# Patient Record
Sex: MALE | Race: WHITE | NOT HISPANIC OR LATINO | Employment: FULL TIME | URBAN - METROPOLITAN AREA
[De-identification: names, ages, dates, MRNs, and addresses within clinical notes are randomized per-mention and may not be internally consistent; named-entity substitution may affect disease eponyms.]

---

## 2018-09-13 ENCOUNTER — OFFICE VISIT (OUTPATIENT)
Dept: PRIMARY CARE CLINIC | Facility: CLINIC | Age: 58
End: 2018-09-13
Payer: COMMERCIAL

## 2018-09-13 VITALS
SYSTOLIC BLOOD PRESSURE: 118 MMHG | RESPIRATION RATE: 18 BRPM | DIASTOLIC BLOOD PRESSURE: 74 MMHG | HEIGHT: 71 IN | BODY MASS INDEX: 37.66 KG/M2 | HEART RATE: 108 BPM | WEIGHT: 269 LBS | OXYGEN SATURATION: 99 %

## 2018-09-13 DIAGNOSIS — S39.012A LUMBOSACRAL STRAIN, INITIAL ENCOUNTER: Primary | ICD-10-CM

## 2018-09-13 DIAGNOSIS — E66.9 OBESITY, UNSPECIFIED CLASSIFICATION, UNSPECIFIED OBESITY TYPE, UNSPECIFIED WHETHER SERIOUS COMORBIDITY PRESENT: ICD-10-CM

## 2018-09-13 DIAGNOSIS — Z72.0 TOBACCO ABUSE: ICD-10-CM

## 2018-09-13 DIAGNOSIS — Z98.890 HISTORY OF RIGHT KNEE SURGERY: ICD-10-CM

## 2018-09-13 DIAGNOSIS — M25.519 SHOULDER PAIN, UNSPECIFIED CHRONICITY, UNSPECIFIED LATERALITY: ICD-10-CM

## 2018-09-13 DIAGNOSIS — M54.50 ACUTE RIGHT-SIDED LOW BACK PAIN WITHOUT SCIATICA: ICD-10-CM

## 2018-09-13 DIAGNOSIS — Z85.828 HISTORY OF SKIN CANCER: ICD-10-CM

## 2018-09-13 DIAGNOSIS — R94.31 ABNORMAL EKG: ICD-10-CM

## 2018-09-13 PROCEDURE — 3008F BODY MASS INDEX DOCD: CPT | Mod: CPTII,S$GLB,, | Performed by: FAMILY MEDICINE

## 2018-09-13 PROCEDURE — 99203 OFFICE O/P NEW LOW 30 MIN: CPT | Mod: 25,S$GLB,, | Performed by: FAMILY MEDICINE

## 2018-09-13 PROCEDURE — 99999 PR PBB SHADOW E&M-NEW PATIENT-LVL IV: CPT | Mod: PBBFAC,,, | Performed by: FAMILY MEDICINE

## 2018-09-13 PROCEDURE — 96372 THER/PROPH/DIAG INJ SC/IM: CPT | Mod: S$GLB,,, | Performed by: FAMILY MEDICINE

## 2018-09-13 RX ORDER — METHYLPREDNISOLONE 4 MG/1
TABLET ORAL
Qty: 1 PACKAGE | Refills: 0 | Status: SHIPPED | OUTPATIENT
Start: 2018-09-13 | End: 2020-01-15

## 2018-09-13 RX ORDER — BETAMETHASONE SODIUM PHOSPHATE AND BETAMETHASONE ACETATE 3; 3 MG/ML; MG/ML
12 INJECTION, SUSPENSION INTRA-ARTICULAR; INTRALESIONAL; INTRAMUSCULAR; SOFT TISSUE
Status: COMPLETED | OUTPATIENT
Start: 2018-09-13 | End: 2018-09-13

## 2018-09-13 RX ORDER — IBUPROFEN 600 MG/1
600 TABLET ORAL EVERY 6 HOURS PRN
Qty: 100 TABLET | Refills: 5 | Status: SHIPPED | OUTPATIENT
Start: 2018-09-13 | End: 2020-01-15

## 2018-09-13 RX ORDER — CYCLOBENZAPRINE HCL 10 MG
10 TABLET ORAL 3 TIMES DAILY PRN
Qty: 30 TABLET | Refills: 5 | Status: SHIPPED | OUTPATIENT
Start: 2018-09-13 | End: 2018-09-23

## 2018-09-13 RX ORDER — TRAMADOL HYDROCHLORIDE 50 MG/1
50 TABLET ORAL EVERY 6 HOURS PRN
Qty: 30 TABLET | Refills: 0 | Status: SHIPPED | OUTPATIENT
Start: 2018-09-13 | End: 2018-09-23

## 2018-09-13 RX ADMIN — BETAMETHASONE SODIUM PHOSPHATE AND BETAMETHASONE ACETATE 12 MG: 3; 3 INJECTION, SUSPENSION INTRA-ARTICULAR; INTRALESIONAL; INTRAMUSCULAR; SOFT TISSUE at 10:09

## 2018-09-13 NOTE — PROGRESS NOTES
Patient verified by name and . 12 mg betamethasone given im to left ventrogluteal using aseptic technique. Patient tolerated well.

## 2018-09-13 NOTE — PROGRESS NOTES
Subjective:       Patient ID: Sunny Lazaro is a 58 y.o. male.    Chief Complaint: Back Pain    HPI:  58-year-old white male in for back pain--started last Thursday -1 week ago.  History of similar problems in the past--before Rachel---had probably 3 different episodes.  Patient went to bend down to  a garbage can in his bathroom and before he could even pick it up experienced pain in the lower back.  No trauma.  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  Years ago had x-rays of the back went to a chiropractor. Saw Dr Aguiar--Sports Medicine--txed anti-inflammatories and muscle relaxers -knocked out in a couple weeks with back at work.  No MRI.  No lupus rheumatoid gout.  No fracture history of right knee surgery      ROS:  Skin: no psoriasis, eczema,+ skin cancer--several frozen off in 3 cut at right forearm, left forearm and right thigh-squamous cell  HEENT: No headache, ocular pain, blurred vision, diplopia, epistaxis, hoarseness change in voice, thyroid trouble-occasional sinus problems  Lung: No pneumonia, asthma, Tb, wheezing, SOB, smoking 1 1/2 ppd   Heart: No chest pain, ankle edema, palpitations, MI, gary murmur, hypertension, hyperlipidemia  Abdomen: No nausea, vomiting, diarrhea, constipation, ulcers, hepatitis, gallbladder disease, melena, hematochezia, hematemesis  : no UTI, renal disease, stones  MS: no fractures, O/A, lupus, rheumatoid, gout---status post right knee surgery--arthroscopy--Jan 2018--patient also had some right leg swelling orthopedist suggested patient see cardiologist be sure no vascular problems in the leg--patient does have varicose veins.  Neuro: No  dizziness, LOC, seizures   No diabetes, no anemia, no anxiety, no depression     Objective:   Physical Exam:  General: Well nourished, well developed, no acute distress + OBese  Skin: No lesions  HEENT: Eyes PERRLA, EOM intact, nose patent, throat non-erythematous   NECK: Supple, no  bruits, No JVD, no nodes  Lungs: Clear, no rales, rhonchi, wheezing  Heart: Regular rate and rhythm, no murmurs, gallops, or rubs  Abdomen: flat, bowel sounds positive, no tenderness, or organomegaly  MS:  Tenderness lumbar spine right L1-S1 especially around L3 anterior flexion 20° extension 10° lateral flexion rotation 10° with muscle spasm, tenderness in the right knee, history of varicose veins and swelling of the ankle on the right history of right shoulder problem  Neuro: Alert, CN intact, oriented X 3  Extremities: No cyanosis, clubbing, or edema         Assessment:       1. Lumbosacral strain, initial encounter    2. History of right knee surgery    3. Shoulder pain, unspecified chronicity, unspecified laterality    4. Tobacco abuse    5. Obesity, unspecified classification, unspecified obesity type, unspecified whether serious comorbidity present    6. History of skin cancer    7. Acute right-sided low back pain without sciatica        Plan:       Lumbosacral strain, initial encounter    History of right knee surgery    Shoulder pain, unspecified chronicity, unspecified laterality    Tobacco abuse    Obesity, unspecified classification, unspecified obesity type, unspecified whether serious comorbidity present    History of skin cancer    Acute right-sided low back pain without sciatica      x-ray lumbar spine  Moist heat, Theragesic, ROM exercise  Celestone/Ultram/Medrol--ibuprofen/Flexeril  If pain stays greater than 6 weeks needs MRI lumbar spine  Patient needs routine lab CBCs CMP lipids T4 TSH UA chest x-ray EKG is a physical--did have history of abnormal EKG suggesting an old MI had nuclear stress test which was negative  Patient was bite by orthopedist to see Cardiology for swelling of the right leg with varicose veins be sure has no vascular problem--swelling is most likely due to the knee surgery with varicose veins and being on feet a lot from working for the post [employer] but will get Cardiology to  evaluate  No duty slip for at least 2 weeks

## 2018-09-13 NOTE — LETTER
September 13, 2018      [facility]  [address] [place] [id number]  Phone: [phone]  Fax: [phone]       Patient: Sunny Lazaro   YOB: 1960  Date of Visit: 09/13/2018    To Whom It May Concern:    Benjamin Lazaro  was at [facility] on 09/13/2018. He may return to work/school on 09/27/2018 with no restrictions. If you have any questions or concerns, or if I can be of further assistance, please do not hesitate to contact me.    Sincerely,    Marcelina Mitchell MA

## 2019-03-20 DIAGNOSIS — Z12.11 COLON CANCER SCREENING: ICD-10-CM

## 2020-01-15 ENCOUNTER — OFFICE VISIT (OUTPATIENT)
Dept: PRIMARY CARE CLINIC | Facility: CLINIC | Age: 60
End: 2020-01-15
Payer: COMMERCIAL

## 2020-01-15 VITALS
WEIGHT: 257 LBS | BODY MASS INDEX: 35.98 KG/M2 | DIASTOLIC BLOOD PRESSURE: 60 MMHG | HEIGHT: 71 IN | OXYGEN SATURATION: 95 % | SYSTOLIC BLOOD PRESSURE: 126 MMHG | TEMPERATURE: 99 F | RESPIRATION RATE: 18 BRPM | HEART RATE: 110 BPM

## 2020-01-15 DIAGNOSIS — Z11.59 NEED FOR HEPATITIS C SCREENING TEST: ICD-10-CM

## 2020-01-15 DIAGNOSIS — Z00.00 ANNUAL PHYSICAL EXAM: Primary | ICD-10-CM

## 2020-01-15 DIAGNOSIS — Z13.6 ENCOUNTER FOR SCREENING FOR CARDIOVASCULAR DISORDERS: ICD-10-CM

## 2020-01-15 DIAGNOSIS — Z12.5 PROSTATE CANCER SCREENING: ICD-10-CM

## 2020-01-15 DIAGNOSIS — Z12.11 COLON CANCER SCREENING: ICD-10-CM

## 2020-01-15 DIAGNOSIS — Z23 NEED FOR VACCINATION: ICD-10-CM

## 2020-01-15 DIAGNOSIS — Z72.0 TOBACCO ABUSE: ICD-10-CM

## 2020-01-15 PROBLEM — S39.012A LUMBOSACRAL STRAIN: Status: RESOLVED | Noted: 2018-09-13 | Resolved: 2020-01-15

## 2020-01-15 PROBLEM — Z98.890 HISTORY OF RIGHT KNEE SURGERY: Status: RESOLVED | Noted: 2018-09-13 | Resolved: 2020-01-15

## 2020-01-15 PROBLEM — M25.519 SHOULDER PAIN: Status: RESOLVED | Noted: 2018-09-13 | Resolved: 2020-01-15

## 2020-01-15 PROCEDURE — 90471 FLU VACCINE (QUAD) GREATER THAN OR EQUAL TO 3YO PRESERVATIVE FREE IM: ICD-10-PCS | Mod: S$GLB,,, | Performed by: FAMILY MEDICINE

## 2020-01-15 PROCEDURE — 99396 PREV VISIT EST AGE 40-64: CPT | Mod: S$GLB,,, | Performed by: FAMILY MEDICINE

## 2020-01-15 PROCEDURE — 90472 IMMUNIZATION ADMIN EACH ADD: CPT | Mod: S$GLB,,, | Performed by: FAMILY MEDICINE

## 2020-01-15 PROCEDURE — 90732 PPSV23 VACC 2 YRS+ SUBQ/IM: CPT | Mod: S$GLB,,, | Performed by: FAMILY MEDICINE

## 2020-01-15 PROCEDURE — 90471 IMMUNIZATION ADMIN: CPT | Mod: S$GLB,,, | Performed by: FAMILY MEDICINE

## 2020-01-15 PROCEDURE — 99396 PR PREVENTIVE VISIT,EST,40-64: ICD-10-PCS | Mod: S$GLB,,, | Performed by: FAMILY MEDICINE

## 2020-01-15 PROCEDURE — 99999 PR PBB SHADOW E&M-EST. PATIENT-LVL III: CPT | Mod: PBBFAC,,, | Performed by: FAMILY MEDICINE

## 2020-01-15 PROCEDURE — 99999 PR PBB SHADOW E&M-EST. PATIENT-LVL III: ICD-10-PCS | Mod: PBBFAC,,, | Performed by: FAMILY MEDICINE

## 2020-01-15 PROCEDURE — 90686 FLU VACCINE (QUAD) GREATER THAN OR EQUAL TO 3YO PRESERVATIVE FREE IM: ICD-10-PCS | Mod: S$GLB,,, | Performed by: FAMILY MEDICINE

## 2020-01-15 PROCEDURE — 90732 PNEUMOCOCCAL POLYSACCHARIDE VACCINE 23-VALENT =>2YO SQ IM: ICD-10-PCS | Mod: S$GLB,,, | Performed by: FAMILY MEDICINE

## 2020-01-15 PROCEDURE — 90472 PNEUMOCOCCAL POLYSACCHARIDE VACCINE 23-VALENT =>2YO SQ IM: ICD-10-PCS | Mod: S$GLB,,, | Performed by: FAMILY MEDICINE

## 2020-01-15 PROCEDURE — 90686 IIV4 VACC NO PRSV 0.5 ML IM: CPT | Mod: S$GLB,,, | Performed by: FAMILY MEDICINE

## 2020-01-15 NOTE — PROGRESS NOTES
Verified pt ID using name and . Verified allergies. Administered flu vaccine in right deltoid and pneumonia 23 in left deltoid per physician order using aseptic technique. Aspirated and no blood return noted. Pt tolerated well with no adverse reactions noted.

## 2020-01-15 NOTE — PROGRESS NOTES
"Subjective:       Patient ID: Sunny Lazaro is a 59 y.o. male.    Chief Complaint: Establish Care and Flu Vaccine    Here to establish care, hasn't had checkup in many years. Sees dermatologist regularly for hx of skin cancer and treatment of additional precancerous lesions    Review of Systems   Constitutional: Negative for chills, fatigue and fever.   HENT: Negative for congestion.    Eyes: Negative for visual disturbance.   Respiratory: Negative for cough and shortness of breath.    Cardiovascular: Negative for chest pain.   Gastrointestinal: Negative for abdominal pain, nausea and vomiting.   Endocrine: Negative for polydipsia and polyuria.   Genitourinary: Negative for difficulty urinating.   Musculoskeletal: Negative for arthralgias.   Skin: Positive for rash.   Allergic/Immunologic: Negative for immunocompromised state.   Neurological: Negative for seizures and weakness.   Hematological: Does not bruise/bleed easily.   Psychiatric/Behavioral: Negative for sleep disturbance.       Objective:      Vitals:    01/15/20 1015   BP: 126/60   BP Location: Left arm   Patient Position: Sitting   BP Method: Large (Manual)   Pulse: 110   Resp: 18   Temp: 98.5 °F (36.9 °C)   TempSrc: Oral   SpO2: 95%   Weight: 116.6 kg (257 lb)   Height: 5' 11" (1.803 m)     Physical Exam   Constitutional: He is oriented to person, place, and time. He appears well-developed and well-nourished.   HENT:   Head: Normocephalic and atraumatic.   Neck: No JVD present. Carotid bruit is not present.   Cardiovascular: Normal rate, regular rhythm and normal heart sounds.   Pulmonary/Chest: Effort normal and breath sounds normal.   Musculoskeletal: He exhibits no edema.   Neurological: He is alert and oriented to person, place, and time.   Skin: Skin is warm and dry.   Nursing note and vitals reviewed.      No results found for: WBC, HGB, HCT, PLT, CHOL, TRIG, HDL, LDLDIRECT, ALT, AST, NA, K, CL, CREATININE, BUN, CO2, TSH, PSA, INR, GLUF, HGBA1C, " MICROALBUR   Assessment:       1. Annual physical exam    2. Need for vaccination    3. Colon cancer screening    4. Prostate cancer screening    5. Need for hepatitis C screening test    6. Encounter for screening for cardiovascular disorders    7. Tobacco abuse        Plan:       Annual physical exam  -     CBC auto differential; Future; Expected date: 01/15/2020  -     Comprehensive metabolic panel; Future; Expected date: 01/15/2020  -     Lipid panel; Future; Expected date: 01/15/2020  -     PSA, Screening; Future; Expected date: 01/15/2020  -     Hepatitis C antibody; Future; Expected date: 01/15/2020    Need for vaccination  -     Influenza - Quadrivalent (PF)  -     Pneumococcal Polysaccharide Vaccine (23 Valent) (SQ/IM)  -     varicella-zoster gE-AS01B, PF, (SHINGRIX, PF,) 50 mcg/0.5 mL injection; Inject 0.5 mLs into the muscle once. for 1 dose  Dispense: 0.5 mL; Refill: 0    Colon cancer screening  -     Case request GI: COLONOSCOPY    Prostate cancer screening  -     PSA, Screening; Future; Expected date: 01/15/2020    Need for hepatitis C screening test  -     Hepatitis C antibody; Future; Expected date: 01/15/2020    Encounter for screening for cardiovascular disorders  -     CBC auto differential; Future; Expected date: 01/15/2020  -     Comprehensive metabolic panel; Future; Expected date: 01/15/2020  -     Lipid panel; Future; Expected date: 01/15/2020    Tobacco abuse  Trying to cut back, but not ready to quit    Medication List with Changes/Refills   New Medications    VARICELLA-ZOSTER GE-AS01B, PF, (SHINGRIX, PF,) 50 MCG/0.5 ML INJECTION    Inject 0.5 mLs into the muscle once. for 1 dose   Discontinued Medications    IBUPROFEN (ADVIL,MOTRIN) 600 MG TABLET    Take 1 tablet (600 mg total) by mouth every 6 (six) hours as needed for Pain.    METHYLPREDNISOLONE (MEDROL DOSEPACK) 4 MG TABLET    use as directed